# Patient Record
Sex: MALE | Race: WHITE | NOT HISPANIC OR LATINO | Employment: OTHER | URBAN - METROPOLITAN AREA
[De-identification: names, ages, dates, MRNs, and addresses within clinical notes are randomized per-mention and may not be internally consistent; named-entity substitution may affect disease eponyms.]

---

## 2024-05-04 ENCOUNTER — OFFICE VISIT (OUTPATIENT)
Age: 49
End: 2024-05-04
Payer: COMMERCIAL

## 2024-05-04 ENCOUNTER — APPOINTMENT (OUTPATIENT)
Age: 49
End: 2024-05-04
Payer: COMMERCIAL

## 2024-05-04 VITALS
HEART RATE: 71 BPM | DIASTOLIC BLOOD PRESSURE: 108 MMHG | TEMPERATURE: 97.9 F | SYSTOLIC BLOOD PRESSURE: 179 MMHG | WEIGHT: 288 LBS | BODY MASS INDEX: 36.96 KG/M2 | OXYGEN SATURATION: 98 % | RESPIRATION RATE: 18 BRPM | HEIGHT: 74 IN

## 2024-05-04 DIAGNOSIS — M54.50 LUMBAR BACK PAIN: ICD-10-CM

## 2024-05-04 DIAGNOSIS — Q76.49: ICD-10-CM

## 2024-05-04 DIAGNOSIS — M54.6 ACUTE BILATERAL THORACIC BACK PAIN: ICD-10-CM

## 2024-05-04 DIAGNOSIS — M54.9 ACUTE BILATERAL BACK PAIN, UNSPECIFIED BACK LOCATION: ICD-10-CM

## 2024-05-04 DIAGNOSIS — M54.9 ACUTE BILATERAL BACK PAIN, UNSPECIFIED BACK LOCATION: Primary | ICD-10-CM

## 2024-05-04 PROCEDURE — 99204 OFFICE O/P NEW MOD 45 MIN: CPT | Performed by: NURSE PRACTITIONER

## 2024-05-04 PROCEDURE — 72072 X-RAY EXAM THORAC SPINE 3VWS: CPT

## 2024-05-04 PROCEDURE — 72110 X-RAY EXAM L-2 SPINE 4/>VWS: CPT

## 2024-05-04 RX ORDER — BUPRENORPHINE 8 MG/1
4 TABLET SUBLINGUAL DAILY
COMMUNITY
Start: 2024-04-26

## 2024-05-04 RX ORDER — OMEPRAZOLE 40 MG/1
40 CAPSULE, DELAYED RELEASE ORAL DAILY
COMMUNITY
Start: 2024-02-15

## 2024-05-04 RX ORDER — METHYLPREDNISOLONE 4 MG/1
TABLET ORAL
Qty: 21 EACH | Refills: 0 | Status: SHIPPED | OUTPATIENT
Start: 2024-05-04

## 2024-05-04 NOTE — PROGRESS NOTES
[facility]        NAME: Alban Wagner is a 48 y.o. male  : 1975    MRN: [id number]  DATE: May 4, 2024  TIME: 10:13 AM    Assessment and Plan   Acute bilateral back pain, unspecified back location [M54.9]  1. Acute bilateral back pain, unspecified back location  XR spine lumbar minimum 4 views non injury    XR spine thoracic 3 vw    Ambulatory Referral to Orthopedic Surgery    Ambulatory referral to Physical Therapy    methylPREDNISolone 4 MG tablet therapy pack      2. Lumbar back pain  Ambulatory Referral to Orthopedic Surgery    Ambulatory referral to Physical Therapy    methylPREDNISolone 4 MG tablet therapy pack      3. Acute bilateral thoracic back pain  Ambulatory Referral to Orthopedic Surgery    Ambulatory referral to Physical Therapy    methylPREDNISolone 4 MG tablet therapy pack      4. 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  Also with some mild disc space narrowing in the lumbar 4-5 area.  Will Heed radiology read.  Discussed with patient orthopedic referral for further evaluation and referral to physical therapy patient is in agreement will start Medrol Dosepak.  Discussed Toradol injection however with blood pressure being elevated will not recommend at this time and patient refusing.  Educated on side effects proper use of medication follow-up with Ortho     Patient Instructions       Follow up with PCP in 3-5 days.  Proceed to  ER if symptoms worsen.    If tests have been performed at [facility], our office will contact you with results if changes need to be made to the care plan discussed with you at the visit.  You can review your full results on [facility] MyChart.    Chief Complaint     Chief Complaint   Patient presents with    • Back Pain     Here for Xray. Has seen chiropractor, was told to get an xray but given no script. Area of L3 is what the chiropractor suggested per patient report.         History of Present Illness       Patient is a 48-year-old male arrives with complaints of back pain mostly in the low back however does have some thoracic back pain.  Denies any injury/trauma to area. Does work as a  with heavy lifting and bending. 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  He reports he has been seeing a chiropractor without relief the chiropractor suggested he get an x-ray.  He does report approximately a week ago he had a colonoscopy completed he did have slight blood following which they told him would be normal that has subsided he denies any black or tarry stool, blood in the stool, urinary habit changes.  No blood in the urine.  He does report sometimes the pain does radiate to the groin area seems to be located in the L3-L5 area.  Denies any numbness tingling reports worse with standing.  Does get shocking pain intermittently.  Was told by the chiropractor that his tailbone was twisted and his hips were off alignment making 1 leg longer than the other.  Patient does have a history of narcotic abuse not willing to take anything with narcotics also is having elevated blood pressure he does report having white coat syndrome his blood pressures run typically 144/80 is being followed by PCP.  Denies any headaches at this time.         Review of Systems   Review of Systems   Constitutional:  Negative for activity change, appetite change, chills, fatigue and fever.   HENT:  Negative for congestion, rhinorrhea, sinus pressure, sinus pain and sore throat.    Respiratory:  Negative for cough, chest tightness and shortness of breath.    Gastrointestinal:  Negative for constipation, diarrhea, nausea and vomiting.  "  Musculoskeletal:  Positive for back pain and gait problem. Negative for myalgias.   Skin:  Negative for color change, pallor and rash.   Neurological:  Negative for dizziness, syncope, weakness, light-headedness and headaches.   Hematological:  Negative for adenopathy.   Psychiatric/Behavioral:  Negative for agitation and confusion.          Current Medications       Current Outpatient Medications:   •  buprenorphine (SUBUTEX) 8 mg, Place 4 mg under the tongue daily, Disp: , Rfl:   •  Cholecalciferol 25 MCG (1000 UT) capsule, Take 2,000 Units by mouth daily, Disp: , Rfl:   •  methylPREDNISolone 4 MG tablet therapy pack, Use as directed on package, Disp: 21 each, Rfl: 0  •  omeprazole (PriLOSEC) 40 MG capsule, Take 40 mg by mouth daily, Disp: , Rfl:     Current Allergies     Allergies as of 05/04/2024 - Reviewed 05/04/2024   Allergen Reaction Noted   • Buspirone Dizziness 09/20/2017            The following portions of the patient's history were reviewed and updated as appropriate: allergies, current medications, past family history, past medical history, past social history, past surgical history and problem list.     History reviewed. No pertinent past medical history.    History reviewed. No pertinent surgical history.    Family History   Problem Relation Age of Onset   • No Known Problems Mother    • Diabetes Father          Medications have been verified.        Objective   BP (!) 179/108   Pulse 71   Temp 97.9 °F (36.6 °C) (Tympanic)   Resp 18   Ht 6' 2\" (1.88 m)   Wt 131 kg (288 lb)   SpO2 98%   BMI 36.98 kg/m²   No LMP for male patient.       Physical Exam     Physical Exam  Vitals and nursing note reviewed.   Constitutional:       General: He is not in acute distress.     Appearance: Normal appearance. He is not ill-appearing, toxic-appearing or diaphoretic.   HENT:      Head: Normocephalic and atraumatic.      Nose: No congestion or rhinorrhea.      Mouth/Throat:      Mouth: Mucous membranes are " moist.   Eyes:      General: No scleral icterus.        Right eye: No discharge.         Left eye: No discharge.      Conjunctiva/sclera: Conjunctivae normal.   Pulmonary:      Effort: Pulmonary effort is normal. No respiratory distress.   Musculoskeletal:         General: Tenderness present. No swelling or signs of injury.      Cervical back: Normal range of motion.      Thoracic back: No swelling, lacerations, spasms, tenderness or bony tenderness. Decreased range of motion.      Lumbar back: Tenderness and bony tenderness present. No swelling, signs of trauma or lacerations. Decreased range of motion.      Comments: Unable to perform SLR with pain.   Skin:     General: Skin is dry.   Neurological:      Mental Status: He is alert and oriented to person, place, and time.   Psychiatric:         Mood and Affect: Mood normal.         Behavior: Behavior normal.         Thought Content: Thought content normal.         Judgment: Judgment normal.